# Patient Record
Sex: MALE | Race: WHITE
[De-identification: names, ages, dates, MRNs, and addresses within clinical notes are randomized per-mention and may not be internally consistent; named-entity substitution may affect disease eponyms.]

---

## 2020-02-17 ENCOUNTER — HOSPITAL ENCOUNTER (OUTPATIENT)
Dept: HOSPITAL 47 - RADXRMAIN | Age: 50
Discharge: HOME | End: 2020-02-17
Attending: NURSE PRACTITIONER
Payer: COMMERCIAL

## 2020-02-17 DIAGNOSIS — R91.8: Primary | ICD-10-CM

## 2020-02-17 PROCEDURE — 71046 X-RAY EXAM CHEST 2 VIEWS: CPT

## 2020-02-17 NOTE — XR
EXAMINATION TYPE: XR chest 2V

 

DATE OF EXAM: 2/17/2020

 

COMPARISON: NONE

 

HISTORY: Chest pain

 

TECHNIQUE:  Frontal and lateral views of the chest are obtained.

 

FINDINGS:  

 

There is right hilar fullness. This likely reflect superimposed vasculature however consider CT of th
e chest to exclude underlying nodule.

 

No evidence for pneumothorax.  No pleural effusion.

 

The cardiac silhouette size is within normal limits.

 

The osseous structures are grossly intact.

 

IMPRESSION:  

 

1.  There is right hilar fullness. This likely reflect superimposed vasculature however consider CT o
f the chest to exclude underlying nodule.

## 2020-02-25 ENCOUNTER — HOSPITAL ENCOUNTER (OUTPATIENT)
Dept: HOSPITAL 47 - RADCTMAIN | Age: 50
Discharge: HOME | End: 2020-02-25
Attending: FAMILY MEDICINE
Payer: COMMERCIAL

## 2020-02-25 DIAGNOSIS — R91.8: Primary | ICD-10-CM

## 2020-02-25 DIAGNOSIS — E11.9: ICD-10-CM

## 2020-02-25 DIAGNOSIS — Z88.2: ICD-10-CM

## 2020-02-25 DIAGNOSIS — R05: ICD-10-CM

## 2020-02-25 LAB — BUN SERPL-SCNC: 16 MG/DL (ref 9–20)

## 2020-02-25 PROCEDURE — 36415 COLL VENOUS BLD VENIPUNCTURE: CPT

## 2020-02-25 PROCEDURE — 71260 CT THORAX DX C+: CPT

## 2020-02-25 PROCEDURE — 84520 ASSAY OF UREA NITROGEN: CPT

## 2020-02-25 PROCEDURE — 82565 ASSAY OF CREATININE: CPT

## 2020-02-25 NOTE — CT
EXAMINATION TYPE: CT chest w con

 

DATE OF EXAM: 2/25/2020

 

COMPARISON: Prior chest x-ray February 17, 2020.

 

HISTORY: abnormal cxr

 

CT DLP: 551 mGycm.   Automated Exposure Control for Dose Reduction was Utilized.

 

 

TECHNIQUE:  CT scan of the thorax is performed following with IV Contrast, patient injected with 100 
mL of Isovue 300.

 

FINDINGS:

 

LUNGS: Some focal linear scarring and/or atelectasis in the lingula just above the diaphragm along wi
th the medial portion of left lung base. Subpleural 3 to 4 mm nodule right lower lobe axial image 43.
 Additional 3 to 4 mm right lower lobe nodule axial image 31. No suspicious greater than 4 mm nodules
 or masses with particular attention to the right hilum at the area of concern on recent x-ray. No pl
eural effusion or pneumothorax. No consolidation. Tracheobronchial tree is patent.

 

 MEDIASTINUM: There are no greater than 1 cm hilar or mediastinal lymph nodes.   No pericardial effus
ion is seen.  Prominent main pulmonary artery at 3.1 cm diameter exophytic [place] 24. CT finding may 
be reflective of underlying pulmonary artery hypertension. Tiny pericardial effusion. Moderate wall t
hickening distal esophagus for reference axial image 45 could reflect product of poor distention, oth
er etiologies including  reflux esophagitis cannot be excluded. . Need to further investigate with di
rect visualization or endoscopy should be based on clinical correlation.

 

OTHER: Possible tiny gallstones and/or sludge in gallbladder near axial image 60. Spine is straighten
ed on sagittal images. Liver is low dense relative to spleen suggesting mild diffuse fatty infiltrati
on.

 

IMPRESSION: No suspicious greater than 4 mm pulmonary nodules or masses. Other findings as noted mila smyth

## 2020-03-09 ENCOUNTER — HOSPITAL ENCOUNTER (OUTPATIENT)
Age: 50
Discharge: HOME | End: 2020-03-09
Payer: COMMERCIAL

## 2020-03-09 DIAGNOSIS — I08.3: Primary | ICD-10-CM

## 2020-03-09 PROCEDURE — 93306 TTE W/DOPPLER COMPLETE: CPT

## 2021-07-05 ENCOUNTER — HOSPITAL ENCOUNTER (OUTPATIENT)
Dept: HOSPITAL 47 - LABWHC1 | Age: 51
Discharge: HOME | End: 2021-07-05
Attending: NURSE PRACTITIONER
Payer: COMMERCIAL

## 2021-07-05 DIAGNOSIS — E78.5: Primary | ICD-10-CM

## 2021-07-05 DIAGNOSIS — I10: ICD-10-CM

## 2021-07-05 DIAGNOSIS — R73.03: ICD-10-CM

## 2021-07-05 LAB
ALBUMIN SERPL-MCNC: 4.6 G/DL (ref 3.8–4.9)
ALBUMIN/GLOB SERPL: 2 G/DL (ref 1.6–3.17)
ALP SERPL-CCNC: 75 U/L (ref 41–126)
ALT SERPL-CCNC: 47 U/L (ref 10–49)
ANION GAP SERPL CALC-SCNC: 10.7 MMOL/L (ref 4–12)
AST SERPL-CCNC: 41 U/L (ref 14–35)
BASOPHILS # BLD AUTO: 0.02 X 10*3/UL (ref 0–0.1)
BASOPHILS NFR BLD AUTO: 0.4 %
BUN SERPL-SCNC: 18 MG/DL (ref 9–27)
BUN/CREAT SERPL: 18 RATIO (ref 12–20)
CALCIUM SPEC-MCNC: 9.1 MG/DL (ref 8.7–10.3)
CHLORIDE SERPL-SCNC: 108 MMOL/L (ref 96–109)
CHOLEST SERPL-MCNC: 136 MG/DL (ref 0–200)
CO2 SERPL-SCNC: 23.3 MMOL/L (ref 21.6–31.8)
EOSINOPHIL # BLD AUTO: 0.12 X 10*3/UL (ref 0.04–0.35)
EOSINOPHIL NFR BLD AUTO: 2.2 %
ERYTHROCYTE [DISTWIDTH] IN BLOOD BY AUTOMATED COUNT: 5.1 X 10*6/UL (ref 4.4–5.6)
ERYTHROCYTE [DISTWIDTH] IN BLOOD: 13 % (ref 11.5–14.5)
GLOBULIN SER CALC-MCNC: 2.3 G/DL (ref 1.6–3.3)
GLUCOSE SERPL-MCNC: 113 MG/DL (ref 70–110)
HBA1C MFR BLD: 6 % (ref 4–6)
HCT VFR BLD AUTO: 45.8 % (ref 39.6–50)
HDLC SERPL-MCNC: 34 MG/DL (ref 40–60)
HGB BLD-MCNC: 14.9 G/DL (ref 13–17)
LDLC SERPL CALC-MCNC: 72.8 MG/DL (ref 0–131)
LYMPHOCYTES # SPEC AUTO: 1.82 X 10*3/UL (ref 0.9–5)
LYMPHOCYTES NFR SPEC AUTO: 33.3 %
MAGNESIUM SPEC-SCNC: 2.2 MG/DL (ref 1.5–2.4)
MCH RBC QN AUTO: 29.2 PG (ref 27–32)
MCHC RBC AUTO-ENTMCNC: 32.5 G/DL (ref 32–37)
MCV RBC AUTO: 89.8 FL (ref 80–97)
MONOCYTES # BLD AUTO: 0.63 X 10*3/UL (ref 0.2–1)
MONOCYTES NFR BLD AUTO: 11.5 %
NEUTROPHILS # BLD AUTO: 2.87 X 10*3/UL (ref 1.8–7.7)
NEUTROPHILS NFR BLD AUTO: 52.4 %
PLATELET # BLD AUTO: 184 X 10*3/UL (ref 140–440)
POTASSIUM SERPL-SCNC: 4.3 MMOL/L (ref 3.5–5.5)
PROT SERPL-MCNC: 6.9 G/DL (ref 6.2–8.2)
SODIUM SERPL-SCNC: 142 MMOL/L (ref 135–145)
TRIGL SERPL-MCNC: 146 MG/DL (ref 0–149)
VLDLC SERPL CALC-MCNC: 29.2 MG/DL (ref 5–40)
WBC # BLD AUTO: 5.47 X 10*3/UL (ref 4.5–10)

## 2021-07-05 PROCEDURE — 36415 COLL VENOUS BLD VENIPUNCTURE: CPT

## 2021-07-05 PROCEDURE — 84443 ASSAY THYROID STIM HORMONE: CPT

## 2021-07-05 PROCEDURE — 80061 LIPID PANEL: CPT

## 2021-07-05 PROCEDURE — 85025 COMPLETE CBC W/AUTO DIFF WBC: CPT

## 2021-07-05 PROCEDURE — 80053 COMPREHEN METABOLIC PANEL: CPT

## 2021-07-05 PROCEDURE — 84481 FREE ASSAY (FT-3): CPT

## 2021-07-05 PROCEDURE — 83036 HEMOGLOBIN GLYCOSYLATED A1C: CPT

## 2021-07-05 PROCEDURE — 83735 ASSAY OF MAGNESIUM: CPT

## 2021-09-16 ENCOUNTER — HOSPITAL ENCOUNTER (OUTPATIENT)
Dept: HOSPITAL 47 - SLEEP | Age: 51
End: 2021-09-16
Attending: INTERNAL MEDICINE
Payer: COMMERCIAL

## 2021-09-16 DIAGNOSIS — I10: ICD-10-CM

## 2021-09-16 DIAGNOSIS — E66.9: ICD-10-CM

## 2021-09-16 DIAGNOSIS — E78.5: ICD-10-CM

## 2021-09-16 DIAGNOSIS — Z98.890: ICD-10-CM

## 2021-09-16 DIAGNOSIS — M51.36: ICD-10-CM

## 2021-09-16 DIAGNOSIS — Z79.891: ICD-10-CM

## 2021-09-16 DIAGNOSIS — Z79.899: ICD-10-CM

## 2021-09-16 DIAGNOSIS — G47.33: Primary | ICD-10-CM

## 2021-09-16 PROCEDURE — 99211 OFF/OP EST MAY X REQ PHY/QHP: CPT

## 2021-09-16 NOTE — CONS
CONSULTATION



DATE OF SERVICE:

09/16/2021



This 50-year-old gentleman has [facility] evaluated [facility] for possible obstructive

sleep apnea-hypopnea syndrome.



HISTORY OF PRESENT ILLNESS/SLEEP-WAKE EVALUATION:

Patient's usual sleep schedule is from 9 or 10 p.m. until 9 or 10 a.m. on weekdays and

until 4 a.m. on weekends. No problems with falling asleep, although he has a TV set in

bedroom.  He sleeps on the back and side positions with snoring.  He wakes up from

sleep 2 times with nocturia.



No history of hypnagogic hallucinations, sleep paralysis or cataplexy.



He denied any significant excessive daytime sleepiness.  [place] Sleepiness Scale is

zero.  He does not take any naps.



PAST MEDICAL HISTORY:

Positive for hypertension, hyperlipidemia, back problems secondary to degenerative disc

disease.



PAST SURGICAL HISTORY:

Bilateral knee surgery, hernia repair.



FAMILY HISTORY:

Positive for obstructive sleep apnea.



MEDICATIONS:

1. Norvasc 5 mg once a day.

2. Lopid 500 mg once a day.

3. Atorvastatin 10 mg once a day.

4. Norco 1000 mg 3 times a day.



SOCIAL HISTORY:

Negative for smoking or using alcohol.



REVIEW OF SYSTEMS:

No fevers.  No double vision.  No recent chest pain.  No shortness of breath.  No

abdominal pain.  No bleeding episodes.  No blood in the urine.  No seizure episodes.



Multiple awakenings from sleep, snoring.



PHYSICAL EXAMINATION:

GENERAL: Pleasant  gentleman without distress.

VITAL SIGNS: /79, HR 70, RR 15, height 5 feet 10-1/2 inches, weight 246 pounds,

body mass index 34.7, afebrile.  Oxygen saturation at room air 98%.

HEENT: PERRLA, EOMI, evaluation of oropharynx showed tongue protrudes midline.

Extremely low position of soft palate; Mallampati IV.

NECK:  Supple, no JVD.  Thyroid is not palpable. Neck measures 19 inches in

circumference.

LUNGS:  Clear to percussion and to auscultation.  Good air exchange.  No wheezing or

rhonchi.

HEART:  S1, S2 regular.  No murmurs, gallops, or rubs.

ABDOMEN:  Obese.

EXTREMITIES: No clubbing or cyanosis.

CNS:  Awake, alert, and oriented X3.  Cranial nerves 2 to 7 intact.  There is no

fasciculation or atrophy. noted.  No focal deficits observed.



IMPRESSION:

1. Snoring, awakenings from sleep with nocturia, extremely low position of soft

    palate, Mallampati IV, wide neck at 19 inches in circumference; obstructive sleep

    apnea-hypopnea syndrome.

2. Obesity; body mass index 34.7.

3. Hypertension.

4. Hyperlipidemia.

5. Patient is on opioids, which may increase risk for central sleep apnea.

6. Back pain secondary to degenerated disc problems.

7. Status post bilateral knee surgery.

8. Status post hernia repair.



PLAN:

1. Polysomnography for evaluation of patient's breathing during sleep.

2. CPAP/BiPAP titration if sleep study confirms obstructive sleep apnea-hypopnea

    syndrome.

3. Preferable position during sleep on the side.

4. No driving if patient feels any sleepiness.

5. I will see patient for follow up visit to explain results of testing and following

    plan.



Thank you very much for referring this patient for consultation.



Sincerely,







Jaya Donahue MD, PhD, FAASM

Diplomat of [employer]

Sleep Medicine Board of American [employer] of [employer]

Medical Director of [facility]





MMODL / MICHELAN: [id number] / Job#: [id number]

## 2022-01-19 ENCOUNTER — HOSPITAL ENCOUNTER (OUTPATIENT)
Dept: HOSPITAL 47 - RADMRIMAIN | Age: 52
Discharge: HOME | End: 2022-01-19
Attending: ORTHOPAEDIC SURGERY
Payer: COMMERCIAL

## 2022-01-19 DIAGNOSIS — M85.642: Primary | ICD-10-CM

## 2022-01-20 NOTE — MR
EXAMINATION TYPE: MR wrist LT wo con

 

DATE OF EXAM: 1/19/2022

 

COMPARISON: None

 

HISTORY: Left hand/wrist pain x 2 mos, hyperextension injury.

 

Multiplanar multiecho imaging of the left wrist was performed without contrast.

 

Intercarpal joint spaces are fairly normal. There are small degenerative cysts in the lunate and the 
proximal scaphoid. There is no evidence of carpal bone fracture. The triangular cartilage appears int
act. Radiocarpal joint appears fairly normal. The proximal metacarpals are intact. There is no eviden
ce of a soft tissue mass. I see no evidence of fracture of the distal radius and ulna. There are smal
l degenerative cysts in the capitate. Flexor tendons of the wrist appear intact.

There is increased signal on the T2 images in large area of the trapezium with some narrowing of the 
first carpometacarpal joint space and mild edema in the adjacent first metacarpal.

IMPRESSION:

There are small degenerative cysts in the carpal bones. There is some edema in the proximal scaphoid 
and could relate to some arthritic development in the scaphoid lunate joint. No evidence of a fractur
e.

 

Increased signal in the trapezium with narrowing of the first carpometacarpal joint space that is con
sistent with arthritic changes and possible bone bruise.

## 2023-06-22 ENCOUNTER — HOSPITAL ENCOUNTER (OUTPATIENT)
Dept: HOSPITAL 47 - RADCTMAIN | Age: 53
Discharge: HOME | End: 2023-06-22
Attending: FAMILY MEDICINE
Payer: COMMERCIAL

## 2023-06-22 DIAGNOSIS — R91.1: ICD-10-CM

## 2023-06-22 DIAGNOSIS — R05.3: ICD-10-CM

## 2023-06-22 DIAGNOSIS — K80.20: Primary | ICD-10-CM

## 2023-06-22 PROCEDURE — 71250 CT THORAX DX C-: CPT

## 2023-06-22 NOTE — CT
EXAMINATION TYPE: CT chest wo con

 

DATE OF EXAM: 6/22/2023

 

EXAMINATION TYPE: CT chest wo con

 

DATE OF EXAM: 6/22/2023

 

COMPARISON: 2/25/2020

 

HISTORY: LUNG NODULE

 

CT DLP: 531 mGycm

 

Unenhanced CT of the chest was performed with lung and mediastinal window settings submitted.  The la
ck of contrast limits evaluation of the vascular, mediastinal and parenchymal structures including th
e upper abdomen.

 

LUNGS: The lungs are clear and free of infiltrate. No atelectasis. Scattered granulomas identified.  
No pulmonary nodule or mass is detected.  No pleural effusion.  No CT evidence of interstitial lung d
isease.

 

MEDIASTINUM/JUWAN:  Thoracic aorta is of normal caliber with limited evaluation given lack of contrast
.  The heart is not enlarged.  No evidence for mediastinal mass.  No  lymph nodes greater than 1cm.

 

UPPER ABDOMEN: Cholelithiasis noted.

 

OTHER: No significant other abnormality.

 

IMPRESSION:

 

1.  Scattered calcified granulomas noted. Evaluation of the lungs is otherwise unremarkable.

2. Cholelithiasis.